# Patient Record
Sex: FEMALE | Race: WHITE | NOT HISPANIC OR LATINO | Employment: FULL TIME | URBAN - METROPOLITAN AREA
[De-identification: names, ages, dates, MRNs, and addresses within clinical notes are randomized per-mention and may not be internally consistent; named-entity substitution may affect disease eponyms.]

---

## 2023-07-18 PROBLEM — E10.9 TYPE 1 DIABETES MELLITUS WITHOUT COMPLICATION: Status: ACTIVE | Noted: 2023-07-18

## 2023-07-28 ENCOUNTER — OFFICE VISIT (OUTPATIENT)
Dept: ENDOCRINOLOGY | Facility: CLINIC | Age: 23
End: 2023-07-28
Payer: COMMERCIAL

## 2023-07-28 ENCOUNTER — SPECIALTY PHARMACY (OUTPATIENT)
Dept: ENDOCRINOLOGY | Facility: CLINIC | Age: 23
End: 2023-07-28
Payer: COMMERCIAL

## 2023-07-28 VITALS
BODY MASS INDEX: 17.66 KG/M2 | WEIGHT: 106 LBS | OXYGEN SATURATION: 98 % | HEIGHT: 65 IN | SYSTOLIC BLOOD PRESSURE: 110 MMHG | HEART RATE: 108 BPM | DIASTOLIC BLOOD PRESSURE: 76 MMHG

## 2023-07-28 DIAGNOSIS — E11.649 DIABETIC HYPOGLYCEMIA: ICD-10-CM

## 2023-07-28 DIAGNOSIS — E10.9 TYPE 1 DIABETES MELLITUS WITHOUT COMPLICATION: Primary | ICD-10-CM

## 2023-07-28 RX ORDER — GLUCAGON INJECTION, SOLUTION 1 MG/.2ML
1 INJECTION, SOLUTION SUBCUTANEOUS AS NEEDED
Qty: 0.4 ML | Refills: 4 | Status: SHIPPED | OUTPATIENT
Start: 2023-07-28

## 2023-07-28 RX ORDER — INSULIN LISPRO 100 [IU]/ML
INJECTION, SOLUTION INTRAVENOUS; SUBCUTANEOUS
COMMUNITY
Start: 2023-07-19

## 2023-07-28 RX ORDER — PROCHLORPERAZINE 25 MG/1
1 SUPPOSITORY RECTAL
Qty: 1 EACH | Refills: 3 | Status: SHIPPED | OUTPATIENT
Start: 2023-07-28

## 2023-07-28 NOTE — PROGRESS NOTES
-----------------------------------------------------------------  ENDOCRINE CLINIC NOTE  -----------------------------------------------------------------        PATIENT NAME: Luis Antonio Sykes  PATIENT : 2000 AGE: 23 y.o.  MRN NUMBER: [id number]  PRIMARY CARE: Amena Pickett APRN    ==========================================================================    CHIEF COMPLAINT: Type 1 Diabetes  DATE OF SERVICE: 2023         ==========================================================================    HPI / SUBJECTIVE    23 y.o. female seen in the clinic today for follow-up visit for type 1 diabetes.  Actively denied fever, chills, headache, chest pain, SOB, cough, nausea, vomiting, abdominal pain, diarrhea and focal weakness / numbness.  Patient was diagnosed with type 1 diabetes around age 15.  Previously has seen Dr. Huynh in the office.  Her current therapy includes:  Basaglar 20 units in the morning and 10 in the evening around 9:00 AM and p.m.  Lispro: 1:50 > 120 : 1-->7 units, typically takes around 2 units with meal.  Recently have used Medtronic 670 G and discontinued 1 year back, she is due for new pump around summer 2024.  Patient have Dexcom G6 reviewed  CGM Data:  Dates: July 15, 2023 till 2023  Very High > 250: 33%  High 180 - 250: 18%  Target: 70 - 180: 32%  Low 55 - 70:  7%  Very Low < 55: 10%    Reports DKA episode last 2022.    ==========================================================================    REVIEW OF SYSTEMS    Constitutional:  Denies fever or chills or tiredness  Eyes: Denies change in vision  HEENT:  Denies headache, sore throat or nasal congestion  Cardiovascular:  Denies chest pain, palpitation or shortness of breath  Respiratory:  Denies cough, shortness of breath or sputum  Gastrointestinal:  Denies abdominal pain, nausea, vomiting, diarrhea or constipation  Genitourinary:  Denies dysuria or polyuria or hematuria  Musculoskeletal:  Denies any  active muscle pain or bone pain  Neurologic:  Denies any focal weakness or numbness  Endocrine:  Please see HPI  Skin:  Denies any rash or skin breakdown    ==========================================================================                                                PAST MEDICAL HISTORY    Past Medical History:   Diagnosis Date    Diabetes mellitus type I        ==========================================================================    PAST SURGICAL HISTORY    Past Surgical History:   Procedure Laterality Date    KNEE SURGERY         ==========================================================================    FAMILY HISTORY    Family History   Problem Relation Age of Onset    Diabetes type II Mother     Diabetes type II Paternal Aunt     Diabetes type II Maternal Grandmother        ==========================================================================    SOCIAL HISTORY    Social History     Socioeconomic History    Marital status: Single    Number of children: 0    Years of education: 12    Highest education level: Some college, no degree   Tobacco Use    Smoking status: Never   Vaping Use    Vaping Use: Former    Devices: Disposable   Substance and Sexual Activity    Alcohol use: Yes     Comment: rarely    Drug use: Never    Sexual activity: Defer       ==========================================================================    MEDICATIONS      Current Outpatient Medications:     citalopram (CeleXA) 20 MG tablet, , Disp: , Rfl:     Continuous Blood Gluc Sensor (Dexcom G6 Sensor), , Disp: , Rfl:     Continuous Blood Gluc Transmit (Dexcom G6 Transmitter) misc, , Disp: , Rfl:     Ferrous Sulfate 27 MG tablet, , Disp: , Rfl:     hydrOXYzine (ATARAX) 10 MG tablet, , Disp: , Rfl:     hydrOXYzine (ATARAX) 25 MG tablet, Take 1 tablet by mouth., Disp: , Rfl:     Insulin Glargine (BASAGLAR KWIKPEN) 100 UNIT/ML injection pen, , Disp: , Rfl:     Insulin Lispro, 1 Unit Dial, (HUMALOG) 100 UNIT/ML solution  pen-injector, , Disp: , Rfl:     Iron-Vitamin C 100-250 MG tablet, Take  by mouth., Disp: , Rfl:     Insulin Lispro, 0.5 Unit Dial, (HUMALOG) 100 UNIT/ML solution pen-injector, , Disp: , Rfl:     ondansetron (ZOFRAN) 4 MG tablet, Take 1 tablet by mouth. (Patient not taking: Reported on 7/28/2023), Disp: , Rfl:     traMADol (ULTRAM) 50 MG tablet, Take  by mouth. (Patient not taking: Reported on 7/28/2023), Disp: , Rfl:     ==========================================================================    ALLERGIES    Allergies   Allergen Reactions    Lantus [Insulin Glargine] Other (See Comments)     Blood sugar spikes       ==========================================================================    OBJECTIVE    Vitals:    07/28/23 1515   BP: 110/76   Pulse: 108   SpO2: 98%     Body mass index is 17.64 kg/m².     General: Alert, cooperative, no acute distress  Lungs: Clear to auscultation bilaterally, respirations unlabored  Heart: Regular rate and rhythm, S1 and S2 normal, no murmur, rub or gallop  Abdomen: Soft, NT, ND and Bowel sounds Positive  Extremities:  Extremities normal, atraumatic, no cyanosis or edema    ==========================================================================    LAB EVALUATION    Lab Results   Component Value Date    GLUCOSE 133 (H) 07/18/2023    BUN 11 07/18/2023    CREATININE 0.67 07/18/2023    BCR 16.4 07/18/2023    K 4.1 07/18/2023    CO2 25.0 07/18/2023    CALCIUM 9.4 07/18/2023    ALBUMIN 4.8 07/18/2023    AST 15 07/18/2023    ALT 12 07/18/2023     Lab Results   Component Value Date    HGBA1C 7.10 (H) 07/18/2023     Lab Results   Component Value Date    MICROALBUR 4.1 07/18/2023    CREATININE 0.67 07/18/2023     Lab Results   Component Value Date    TSH 1.330 07/18/2023    FREET4 1.14 07/18/2023     tatus: Final result       Visible to patient: Yes (seen)       Next appt: 09/18/2023 at 03:00 PM in Endocrinology ([address])       Dx: Type 1 diabetes mellitus without  comp...    Specimen Information: Blood   0 Result Notes      Component  Ref Range & Units 10 d ago   NILSA-65  0.0 - 5.0 U/mL Comment Abnormal    Comment: NILSA-65 result greater than 25,000 U/mL  Results confirmed on dilution.  Results confirmed on  dilution.        Component      Latest Ref Rng 7/18/2023   C-Peptide      1.1 - 4.4 ng/mL <0.1 (L)       (L) Low    ==========================================================================    ASSESSMENT AND PLAN    #Type 1 diabetes with history of DKA  #Hypoglycemia with unawareness  - Reviewed continuous glucose monitoring data  - Reviewed blood work including A1c, CBC, CMP, urine microalbumin, antibody profile and C-peptide levels  - There is definitive diagnosis of type 1 diabetes as patient has absolute no C-peptide levels and significantly elevated nilsa 65 which is confirmatory for type 1 diabetes  - Review of continuous glucose monitoring there is evidence significant for hypoglycemia that is happening around 1 to 2 AM and lasting up till 8 AM along with evidence of postprandial hyperglycemia  - Patient is currently being treated with high basal insulin and inappropriately low mealtime coverage therefore basal insulin is trying to behave as mealtime insulin  - For now we will redistribute insulin therapy  - Her new regimen is going to be:  *Basaglar 20 units once a day  *Insulin lispro 5 units with each meal  *Correction scale #1  - Patient to continue CGM monitoring and follow-up in my clinic in 2 months time  - TSH and free T4 were evaluated, she continues to remain high risk for autoimmune thyroiditis in future but currently within acceptable limit  - We will continue thyroid profile monitoring once a year  - Patient is scheduled to follow-up with diabetic educator in September 2023  - In future if patient is more comfortable can switch insulin lispro coverage to carb counting    Thank you for courtesy of consultation.    Return to clinic: 2 months    Entire  "assessment and plan was discussed and counseled the patient in detail to which patient verbalized understanding and agreed with care.  Answered all queries and concerns.    This note was created using voice recognition software and is inherently subject to errors including those of syntax and \"sound-alike\" substitutions which may escape proofreading.  In such instances, original meaning may be extrapolated by contextual derivation.    ==========================================================================    INFORMATION PROVIDED TO PATIENT    Patient Instructions   Please,    # Diabetes Management:    Please start insulin therapy as:    - Insulin Glargine (Slow acting insulin) 20 Units in the morning.  - Insulin lispro / aspart (Fast acting / meal time insulin): 5 Units with each meal.    Meal time insulin need to be taken 15 mins before meal. You can bring your insulin with you if you are planning to eat out.    If you plan to miss the meal please miss the meal time insulin as well.    Check your blood glucose four times a day: before breakfast, before lunch, before dinner and before bedtime. Maintain blood glucose logs.    - If your blood glucose in the morning / fasting is less than 100 please decrease the dose of Insulin Glargine by 5 units.    - If your blood glucose before meal is less than 100 then decrease the dose of Insulin Lispro / Aspart by 2 units    Use correction insulin if your blood is high along with your meal time insulin. Use this correction insulin before meal only, not to be taken without meal.    Corrections for High Blood Sugar  Use the following guide to “correct” for pre-meal high blood sugar.  This insulin will help “correct” the high reading.  You will still need to take as per scheduled meal insulin.    If your   Blood Sugar Reading is…. Number of additional   units of Insulin Lispro to Take…  (Correction)    Take 0 additional unit   151-200 Take 1 additional unit   201-250 Take " 2 additional unit   251-300 Take 3 additional unit   301-350 Take 4 additional unit   351-400 Take 5 additional unit   More than 400 Take 6 additional unit         Low Blood Glucose:    - If you feel sweaty, anxious, shakiness, feel weak, trouble walking, feels like passing out or trouble seeing thing, please check your blood glucose and if its less than 70 or if your feel symptoms of low blood glucose then take one of the following or use Glucagon Injection:    *3 or 4 glucose tablets  *½ cup of juice or regular soda (not sugar-free)  *2 tablespoons of raisins  *4 or 5 saltine crackers  *1 tablespoon of sugar  *1 tablespoon of honey or corn syrup  *6 to 8 hard candies    Follow up in 2 months    Thank you for your visit today.    If you have any questions or concerns please feel free to reach out of the office.        ==========================================================================  Boyd Ronquillo MD  Department of Endocrine, Diabetes and Metabolism  [facility], [place]  ==========================================================================

## 2023-07-28 NOTE — PROGRESS NOTES
Specialty Pharmacy Patient Management Program  Endocrinology Introduction to Services Outreach     Luis Antonio Sykes is a 23 y.o. female seen by an Endocrinology provider for Type 1 Diabetes . This was an initial visit to introduce Endocrinology Patient Management Program and Specialty Pharmacy services offered by [facility], as the patient is taking specialty medication continuous glucose monitor, basaglar, and humalog.     Luis Antonio Sykes is undecided about filling specialty medication at [facility] and/or enrollment in the Endocrine Disorders Patient Management Program at this time and enrollment is therefore UNDER REVIEW. Patient remains eligible for services in the future if desired. Plan to follow-up at her next office visit.    Results of Benefits Investigation  Medicaid-$0     Relevant Past Medical History and Comorbidities  Past Medical History:   Diagnosis Date    Diabetes mellitus type I      Social History     Socioeconomic History    Marital status: Single    Number of children: 0    Years of education: 12    Highest education level: Some college, no degree   Tobacco Use    Smoking status: Never   Vaping Use    Vaping Use: Former    Devices: Disposable   Substance and Sexual Activity    Alcohol use: Yes     Comment: rarely    Drug use: Never    Sexual activity: Defer          Allergies  Lantus [insulin glargine]       Current Medication List    Current Outpatient Medications:     citalopram (CeleXA) 20 MG tablet, , Disp: , Rfl:     Continuous Blood Gluc Sensor (Dexcom G6 Sensor), , Disp: , Rfl:     Continuous Blood Gluc Transmit (Dexcom G6 Transmitter) misc, 1 each by Other route 4 (Four) Times a Day Before Meals & at Bedtime. Change every 3 months., Disp: 1 each, Rfl: 3    Ferrous Sulfate 27 MG tablet, , Disp: , Rfl:     Glucagon (Gvoke HypoPen 2-Pack) 1 MG/0.2ML solution auto-injector, Inject 1 each under the skin into the appropriate area as directed As Needed  (Hypoglycemia)., Disp: 0.4 mL, Rfl: 4    hydrOXYzine (ATARAX) 10 MG tablet, , Disp: , Rfl:     hydrOXYzine (ATARAX) 25 MG tablet, Take 1 tablet by mouth., Disp: , Rfl:     Insulin Glargine (BASAGLAR KWIKPEN) 100 UNIT/ML injection pen, , Disp: , Rfl:     Insulin Lispro, 1 Unit Dial, (HUMALOG) 100 UNIT/ML solution pen-injector, , Disp: , Rfl:     Iron-Vitamin C 100-250 MG tablet, Take  by mouth., Disp: , Rfl:        Changes to Therapy Plan Discussed with Patient  Medication Therapy Changes by Provider New start glucagon in hypoglycemic patient. Provided education on administration. Continue basaglar at 20 units in the morning and humalog 5 units with meals.   Discussed briefly the pharmacy services and provided patient with our contact info. Will follow up with patient at a future appointment.  Additional Plans, Therapy Recommendations, or Therapy Problems to Be Addressed: None     Eneida Cleary, PharmD  Clinical Specialty Pharmacist, Endocrinology  7/28/2023  16:08 EDT

## 2023-07-28 NOTE — PATIENT INSTRUCTIONS
Please,    # Diabetes Management:    Please start insulin therapy as:    - Insulin Glargine (Slow acting insulin) 20 Units in the morning.  - Insulin lispro / aspart (Fast acting / meal time insulin): 5 Units with each meal.    Meal time insulin need to be taken 15 mins before meal. You can bring your insulin with you if you are planning to eat out.    If you plan to miss the meal please miss the meal time insulin as well.    Check your blood glucose four times a day: before breakfast, before lunch, before dinner and before bedtime. Maintain blood glucose logs.    - If your blood glucose in the morning / fasting is less than 100 please decrease the dose of Insulin Glargine by 5 units.    - If your blood glucose before meal is less than 100 then decrease the dose of Insulin Lispro / Aspart by 2 units    Use correction insulin if your blood is high along with your meal time insulin. Use this correction insulin before meal only, not to be taken without meal.    Corrections for High Blood Sugar  Use the following guide to “correct” for pre-meal high blood sugar.  This insulin will help “correct” the high reading.  You will still need to take as per scheduled meal insulin.    If your   Blood Sugar Reading is…. Number of additional   units of Insulin Lispro to Take…  (Correction)    Take 0 additional unit   151-200 Take 1 additional unit   201-250 Take 2 additional unit   251-300 Take 3 additional unit   301-350 Take 4 additional unit   351-400 Take 5 additional unit   More than 400 Take 6 additional unit         Low Blood Glucose:    - If you feel sweaty, anxious, shakiness, feel weak, trouble walking, feels like passing out or trouble seeing thing, please check your blood glucose and if its less than 70 or if your feel symptoms of low blood glucose then take one of the following or use Glucagon Injection:    *3 or 4 glucose tablets  *½ cup of juice or regular soda (not sugar-free)  *2 tablespoons of raisins  *4 or  5 saltine crackers  *1 tablespoon of sugar  *1 tablespoon of honey or corn syrup  *6 to 8 hard candies    Follow up in 2 months    Thank you for your visit today.    If you have any questions or concerns please feel free to reach out of the office.

## 2023-09-18 ENCOUNTER — OFFICE VISIT (OUTPATIENT)
Dept: ENDOCRINOLOGY | Facility: CLINIC | Age: 23
End: 2023-09-18
Payer: COMMERCIAL

## 2023-09-18 ENCOUNTER — TELEPHONE (OUTPATIENT)
Dept: ENDOCRINOLOGY | Facility: CLINIC | Age: 23
End: 2023-09-18

## 2023-09-18 DIAGNOSIS — E10.9 TYPE 1 DIABETES MELLITUS WITHOUT COMPLICATION: Primary | ICD-10-CM

## 2023-09-18 PROCEDURE — G0108 DIAB MANAGE TRN  PER INDIV: HCPCS

## 2023-09-18 NOTE — PROGRESS NOTES
Pt here today for 50 min from 3:00 -3:50 PM for DSMES. T1DM dx at age 15 (8 yrs ago). Pt has been through DM classes, but here for diet focused refresher. Pt states that her BG has been running high for past 3 weeks, also reports undesired wt loss over the past few months. Currently 101 lbs, goal wt ~120lbs. Pt saw Dr Ronquillo on 7/28/23. Insulin was adjusted to Basaglar 20 units once a day and lispro 5 units with each meal. Pt did not understand that she was to take 5 units of insulin in addition to correction scale. Pt has been taking 1 unit if between 151-200 vs 6 units. Pt to start taking insulin as rx'd and call Friday if BG continues in 200s. Reviewed Dexcom clarity report. Pt avg 253, very high 52% of the time, 1% low, and <1% very low. Pt states that she has not had problems with low BG since seeing Dr Ronquillo. Per pt needs rx for Ketostix--reviewed use. Pt to test while running high. Reviewed DKA symptoms with pt. Pt currently eating 3 meals per day. Discussed ways to increase kcals, protein, and fiber. Counseled on one plate method/label reading/carb counting. Pt with good background knowledge. Pt to add 2 snacks per day--15g carb + protein. RD to send rx for ketostix.      Corrections for High Blood Sugar per MD note  Use the following guide to “correct” for pre-meal high blood sugar.  This insulin will help “correct” the high reading.  You will still need to take as per scheduled meal insulin.     If your   Blood Sugar Reading is…. Number of additional   units of Insulin Lispro to Take…  (Correction)    Take 0 additional unit   151-200 Take 1 additional unit   201-250 Take 2 additional unit   251-300 Take 3 additional unit   301-350 Take 4 additional unit   351-400 Take 5 additional unit   More than 400 Take 6 additional unit

## 2023-09-29 ENCOUNTER — OFFICE VISIT (OUTPATIENT)
Dept: ENDOCRINOLOGY | Facility: CLINIC | Age: 23
End: 2023-09-29
Payer: COMMERCIAL

## 2023-09-29 VITALS
SYSTOLIC BLOOD PRESSURE: 110 MMHG | BODY MASS INDEX: 17.49 KG/M2 | HEART RATE: 118 BPM | WEIGHT: 105 LBS | DIASTOLIC BLOOD PRESSURE: 78 MMHG | HEIGHT: 65 IN | OXYGEN SATURATION: 98 %

## 2023-09-29 DIAGNOSIS — E10.9 TYPE 1 DIABETES MELLITUS WITHOUT COMPLICATION: Primary | ICD-10-CM

## 2023-09-29 DIAGNOSIS — E11.649 DIABETIC HYPOGLYCEMIA: ICD-10-CM

## 2023-09-29 RX ORDER — PEN NEEDLE, DIABETIC 30 GX3/16"
1 NEEDLE, DISPOSABLE MISCELLANEOUS
Qty: 200 EACH | Refills: 5 | Status: SHIPPED | OUTPATIENT
Start: 2023-09-29

## 2023-09-29 RX ORDER — INSULIN LISPRO 100 [IU]/ML
8 INJECTION, SOLUTION INTRAVENOUS; SUBCUTANEOUS 3 TIMES DAILY
Qty: 15 ML | Refills: 5 | Status: SHIPPED | OUTPATIENT
Start: 2023-09-29

## 2023-09-29 RX ORDER — TRAZODONE HYDROCHLORIDE 50 MG/1
50 TABLET ORAL NIGHTLY
COMMUNITY
Start: 2023-08-17

## 2023-09-29 RX ORDER — INSULIN GLARGINE 100 [IU]/ML
18 INJECTION, SOLUTION SUBCUTANEOUS DAILY
Qty: 15 ML | Refills: 5 | Status: SHIPPED | OUTPATIENT
Start: 2023-09-29

## 2023-09-29 RX ORDER — PROCHLORPERAZINE 25 MG/1
SUPPOSITORY RECTAL
Qty: 9 EACH | Refills: 3 | Status: SHIPPED | OUTPATIENT
Start: 2023-09-29

## 2023-09-29 NOTE — PATIENT INSTRUCTIONS
Please,    # Diabetes Management:    Please start insulin therapy as:    - Insulin Glargine (Slow acting insulin) 18 Units in the morning.  - Insulin lispro / aspart (Fast acting / meal time insulin): Use insulin to carb ratio.  1 unit for every 20 g of carbohydrates.  If after 1 week time you still see significant high sugar after meal you can increase your carb ratios to 1 unit for every 15 g of carbohydrate.    Meal time insulin need to be taken 15 mins before meal. You can bring your insulin with you if you are planning to eat out.    If you plan to miss the meal please miss the meal time insulin as well.    Check your blood glucose four times a day: before breakfast, before lunch, before dinner and before bedtime. Maintain blood glucose logs.    - If your blood glucose in the morning / fasting is less than 100 please decrease the dose of Insulin Glargine by 3 units.    Use correction insulin if your blood is high along with your meal time insulin. Use this correction insulin before meal only, not to be taken without meal.    Corrections for High Blood Sugar  Use the following guide to “correct” for pre-meal high blood sugar.  This insulin will help “correct” the high reading.  You will still need to take as per scheduled meal insulin.    If your   Blood Sugar Reading is…. Number of additional   units of Insulin Lispro to Take…  (Correction)    Take 0 additional unit   151-200 Take 1 additional unit   201-250 Take 2 additional unit   251-300 Take 3 additional unit   301-350 Take 4 additional unit   351-400 Take 5 additional unit   More than 400 Take 6 additional unit         Low Blood Glucose:    - If you feel sweaty, anxious, shakiness, feel weak, trouble walking, feels like passing out or trouble seeing thing, please check your blood glucose and if its less than 70 or if your feel symptoms of low blood glucose then take one of the following or use Glucagon Injection:    *3 or 4 glucose tablets  *½ cup of  juice or regular soda (not sugar-free)  *2 tablespoons of raisins  *4 or 5 saltine crackers  *1 tablespoon of sugar  *1 tablespoon of honey or corn syrup  *6 to 8 hard candies    Follow up in 3 months with repeat blood work.    Thank you for your visit today.    If you have any questions or concerns please feel free to reach out of the office.

## 2023-09-29 NOTE — PROGRESS NOTES
-----------------------------------------------------------------  ENDOCRINE CLINIC NOTE  -----------------------------------------------------------------        PATIENT NAME: Luis Antonio Sykes  PATIENT : 2000 AGE: 23 y.o.  MRN NUMBER: [id number]  PRIMARY CARE: Amena Pickett APRN    ==========================================================================    CHIEF COMPLAINT: Type 1 Diabetes  DATE OF SERVICE: 23     ==========================================================================    HPI / SUBJECTIVE    23 y.o. female seen in the clinic today for follow-up visit for type 1 diabetes.  Last A1c was 7.1% on 2023.  Diagnosed with type 1 diabetes around age 15.  Previous used to follow Dr. Huynh.  Current therapy includes:  Basaglar 20 units morning  Insulin lispro 5 units with each meal plus sliding scale #1  Recently had diabetic education evaluation.  During evaluation there was a concern that there was a miscommunication and patient was doing 20 units of Basaglar in the morning along with insulin lispro only has correction starting at 5 units.  Discussed by dietitian that patient needs to take 5 units and correction scale along with it.  Patient verbalized understanding after that visit and started therapy accordingly.  Checking blood sugar through Dexcom G6.  Patient was out of Dexcom for last 5 days duration and checking through [employer].  Patient had previously used Medtronic 670 G and discontinue around 1 week back patient is due for new pump around summer 2024.  Reports DKA episode last 2022.    ==========================================================================    REVIEW OF SYSTEMS    Constitutional:  Denies fever or chills or tiredness  Eyes: Denies change in vision  HEENT:  Denies headache, sore throat or nasal congestion  Cardiovascular:  Denies chest pain, palpitation or shortness of breath  Respiratory:  Denies cough, shortness of breath or  sputum  Gastrointestinal:  Denies abdominal pain, nausea, vomiting, diarrhea or constipation  Genitourinary:  Denies dysuria or polyuria or hematuria  Musculoskeletal:  Denies any active muscle pain or bone pain  Neurologic:  Denies any focal weakness or numbness  Endocrine:  Please see HPI  Skin:  Denies any rash or skin breakdown    ==========================================================================                                                PAST MEDICAL HISTORY    Past Medical History:   Diagnosis Date    Diabetes mellitus type I        ==========================================================================    PAST SURGICAL HISTORY    Past Surgical History:   Procedure Laterality Date    KNEE SURGERY         ==========================================================================    FAMILY HISTORY    Family History   Problem Relation Age of Onset    Diabetes type II Mother     Diabetes Mother         Diseases    Diabetes type II Paternal Aunt     Diabetes type II Maternal Grandmother     Diabetes Maternal Grandmother         Diseased    Diabetes Maternal Aunt        ==========================================================================    SOCIAL HISTORY    Social History     Socioeconomic History    Marital status: Single    Number of children: 0    Years of education: 12    Highest education level: Some college, no degree   Tobacco Use    Smoking status: Never   Vaping Use    Vaping Use: Former    Devices: Disposable   Substance and Sexual Activity    Alcohol use: Yes     Comment: rarely    Drug use: Never    Sexual activity: Yes     Partners: Male     Birth control/protection: Nexplanon       ==========================================================================    MEDICATIONS      Current Outpatient Medications:     acetone, urine, test strip, Use 1 strip As Needed for High Blood Sugar., Disp: 50 each, Rfl: 3    citalopram (CeleXA) 20 MG tablet, , Disp: , Rfl:     Continuous Blood Gluc  Transmit (Dexcom G6 Transmitter) misc, 1 each by Other route 4 (Four) Times a Day Before Meals & at Bedtime. Change every 3 months., Disp: 1 each, Rfl: 3    Ferrous Sulfate 27 MG tablet, , Disp: , Rfl:     Glucagon (Gvoke HypoPen 2-Pack) 1 MG/0.2ML solution auto-injector, Inject 1 each under the skin into the appropriate area as directed As Needed (Hypoglycemia)., Disp: 0.4 mL, Rfl: 4    hydrOXYzine (ATARAX) 10 MG tablet, , Disp: , Rfl:     hydrOXYzine (ATARAX) 25 MG tablet, Take 1 tablet by mouth., Disp: , Rfl:     Iron-Vitamin C 100-250 MG tablet, Take  by mouth., Disp: , Rfl:     traZODone (DESYREL) 50 MG tablet, Take 1 tablet by mouth Every Night., Disp: , Rfl:     ==========================================================================    ALLERGIES    Allergies   Allergen Reactions    Lantus [Insulin Glargine] Other (See Comments)     Blood sugar spikes       ==========================================================================    OBJECTIVE    Vitals:    09/29/23 1436   BP: 110/78   Pulse: 118   SpO2: 98%     Body mass index is 17.47 kg/m².     General: Alert, cooperative, no acute distress  Lungs: Clear to auscultation bilaterally, respirations unlabored  Heart: Regular rate and rhythm, S1 and S2 normal, no murmur, rub or gallop  Abdomen: Soft, NT, ND and Bowel sounds Positive  Extremities:  Extremities normal, atraumatic, no cyanosis or edema    ==========================================================================    LAB EVALUATION    Lab Results   Component Value Date    GLUCOSE 133 (H) 07/18/2023    BUN 11 07/18/2023    CREATININE 0.67 07/18/2023    BCR 16.4 07/18/2023    K 4.1 07/18/2023    CO2 25.0 07/18/2023    CALCIUM 9.4 07/18/2023    ALBUMIN 4.8 07/18/2023    AST 15 07/18/2023    ALT 12 07/18/2023     Lab Results   Component Value Date    HGBA1C 7.10 (H) 07/18/2023     Lab Results   Component Value Date    MICROALBUR 4.1 07/18/2023    CREATININE 0.67 07/18/2023     Lab Results   Component  Value Date    TSH 1.330 07/18/2023    FREET4 1.14 07/18/2023     CGM Data:    Dates: September 9, 2023 till September 22, 2023    Very High > 250: 44%  High 180 - 250: 19%  Target: 70 - 180: 34%  Low 55 - 70:  2%  Very Low < 55: Less than 1%    Patient had standard deviation of 106 with average blood glucose of 232 with GMI of 8.9%.  This was the reading on blood glucose when patient was doing correction scale only when not using scheduled mealtime.  Patient for last days is using most scheduled mealtime and there is evidence of some hypoglycemia or low normal blood glucose post dinnertime.  Also there is a pattern of nocturnal hypoglycemia.    ==========================================================================    ASSESSMENT AND PLAN    #Type 1 diabetes with history of DKA  #Hypoglycemia with unawareness    -Reviewed CGM data and there is evidence of nocturnal hypoglycemia will de-escalate the dose of basal insulin to 18 units, target is to maintain nighttime blood sugar between 90 and 140  - Also patient is using mealtime insulin now after the dietitian visit and blood sugar are better but there is episodes of hypoglycemia associated as well  - Patient seems to be confident about insulin to carb ratio, in the past patient have used insulin carb ratio 1 is to 25  - This may be little low for this patient, discussed with patient either using insulin to carb ratio of 1:20 or 1: 15, plan is to start with 1: 20 to avoid any hypoglycemia for now  - If needed can escalate therapy to 1: 15 ICU  - ISF will continue to remain 1: 50 > 150  - Patient to follow-up in my clinic back again in 3 months time  - Repeat blood work before next visit    Thank you for courtesy of consultation.    Return to clinic: 3 months    Entire assessment and plan was discussed and counseled the patient in detail to which patient verbalized understanding and agreed with care.  Answered all queries and concerns.    This note was created using  "voice recognition software and is inherently subject to errors including those of syntax and \"sound-alike\" substitutions which may escape proofreading.  In such instances, original meaning may be extrapolated by contextual derivation.    ==========================================================================    INFORMATION PROVIDED TO PATIENT    Patient Instructions   Please,    # Diabetes Management:    Please start insulin therapy as:    - Insulin Glargine (Slow acting insulin) 18 Units in the morning.  - Insulin lispro / aspart (Fast acting / meal time insulin): Use insulin to carb ratio.  1 unit for every 20 g of carbohydrates.  If after 1 week time you still see significant high sugar after meal you can increase your carb ratios to 1 unit for every 15 g of carbohydrate.    Meal time insulin need to be taken 15 mins before meal. You can bring your insulin with you if you are planning to eat out.    If you plan to miss the meal please miss the meal time insulin as well.    Check your blood glucose four times a day: before breakfast, before lunch, before dinner and before bedtime. Maintain blood glucose logs.    - If your blood glucose in the morning / fasting is less than 100 please decrease the dose of Insulin Glargine by 3 units.    Use correction insulin if your blood is high along with your meal time insulin. Use this correction insulin before meal only, not to be taken without meal.    Corrections for High Blood Sugar  Use the following guide to “correct” for pre-meal high blood sugar.  This insulin will help “correct” the high reading.  You will still need to take as per scheduled meal insulin.    If your   Blood Sugar Reading is…. Number of additional   units of Insulin Lispro to Take…  (Correction)    Take 0 additional unit   151-200 Take 1 additional unit   201-250 Take 2 additional unit   251-300 Take 3 additional unit   301-350 Take 4 additional unit   351-400 Take 5 additional unit   More " than 400 Take 6 additional unit         Low Blood Glucose:    - If you feel sweaty, anxious, shakiness, feel weak, trouble walking, feels like passing out or trouble seeing thing, please check your blood glucose and if its less than 70 or if your feel symptoms of low blood glucose then take one of the following or use Glucagon Injection:    *3 or 4 glucose tablets  *½ cup of juice or regular soda (not sugar-free)  *2 tablespoons of raisins  *4 or 5 saltine crackers  *1 tablespoon of sugar  *1 tablespoon of honey or corn syrup  *6 to 8 hard candies    Follow up in 3 months with repeat blood work.    Thank you for your visit today.    If you have any questions or concerns please feel free to reach out of the office.        ==========================================================================  Boyd Ronquillo MD  Department of Endocrine, Diabetes and Metabolism  [facility] [place]  ==========================================================================

## 2023-10-05 ENCOUNTER — TELEPHONE (OUTPATIENT)
Dept: ENDOCRINOLOGY | Facility: CLINIC | Age: 23
End: 2023-10-05
Payer: COMMERCIAL

## 2023-10-05 NOTE — TELEPHONE ENCOUNTER
Called and spoke to pt regarding her shirlene about insulin pump warranty. Informed her that she could call the insurance company and see when she would be eligible for a new pump as warranty status doesn't determine if her pump would be upgraded. Pt acknowledges understanding.       Mariela Estrada RD, LD, [facility]   Nutrition and Diabetes Education     Diabetes Center   [facility] Endocrinology/Dia  [address] [facility] [place] [address]

## 2023-11-06 ENCOUNTER — TELEPHONE (OUTPATIENT)
Dept: ENDOCRINOLOGY | Facility: CLINIC | Age: 23
End: 2023-11-06
Payer: COMMERCIAL

## 2023-11-06 NOTE — TELEPHONE ENCOUNTER
Called pharmacy regarding Dexcom. Sensors are ready for . It was too early to fill transmitter at last request. Now able to refill transmitter. Had pharmacy start the order for transmitter.       Called and let pt know. Pt acknowledged understanding.       Mariela Estrada RD, LD, [facility]   Nutrition and Diabetes Education     Diabetes Center   [facility] Endocrinology/Dia  [address] [place] [address]

## 2023-11-06 NOTE — TELEPHONE ENCOUNTER
Received call from pt regarding her Dexcom. Pt states that the pharmacy wont fill it.    I attempted to call pharmacy but they were at lunch. Will call back later this afternoon to speak with them.       Mariela Estrada RD, LD, [facility]   Nutrition and Diabetes Education     Diabetes Center   [facility] Endocrinology/Dia  [address] [place] [address]

## 2023-11-30 ENCOUNTER — TELEPHONE (OUTPATIENT)
Dept: ENDOCRINOLOGY | Facility: CLINIC | Age: 23
End: 2023-11-30
Payer: COMMERCIAL

## 2023-12-11 ENCOUNTER — TELEPHONE (OUTPATIENT)
Dept: ENDOCRINOLOGY | Facility: CLINIC | Age: 23
End: 2023-12-11
Payer: COMMERCIAL

## 2023-12-11 NOTE — TELEPHONE ENCOUNTER
Spoke with patient and PA required for [facility], pt is allergic to Lantus. Started PA (Key: [id number])    Approved today  PA Case: [id number], Status: Approved, Coverage Starts on: 12/11/2023 12:00:00 AM, Coverage Ends on: 12/10/2024 12:00:00 AM.     Notified patient

## 2023-12-13 ENCOUNTER — TELEPHONE (OUTPATIENT)
Dept: ENDOCRINOLOGY | Facility: CLINIC | Age: 23
End: 2023-12-13
Payer: COMMERCIAL

## 2023-12-13 NOTE — TELEPHONE ENCOUNTER
Caller: [facility]AN[facility]    Relationship to patient:     Best call back number: [phone]     Patient is needing: [facility] HAS FAXED US A REQUEST 3 TIMES.  11/29, 12/6 AND AGAIN TODAY 12/13 THEY ARE SENDING AGAIN TO THE VERIFIED FAX NUMBER. PT NEEDS US TO CONTACT [facility] WITH PROPER PAPERWORK/ OFFICE NOTES SO PT CAN GET THE CARE THEY NEED FROM [facility] AND [facility]. PLEASE ADVISE ASAP.

## 2023-12-14 NOTE — TELEPHONE ENCOUNTER
ON from 9/29/23 sent to [facility] medical. PO and Face to Face encounter summary on MD desk for signature.  Will need to fax to [facility] medical once signed.

## 2023-12-15 ENCOUNTER — TELEPHONE (OUTPATIENT)
Dept: ENDOCRINOLOGY | Facility: CLINIC | Age: 23
End: 2023-12-15

## 2023-12-15 NOTE — TELEPHONE ENCOUNTER
Patient calling again needing an update on [facility]. Told patient ON were faxed over and paperwork on providers desk per Jigna's note. Paperwork on providers desk was signed but wrong provider listed so I did not fax paperwork to [facility]. Please advise.

## 2023-12-15 NOTE — TELEPHONE ENCOUNTER
ON from July and Sept sent to [facility].  They are faxing over a new physicians order with the correct MD on it.  The one we had given Dr. Ronquillo had a different doctors name on it. Need to fax to [facility] as soon as it is signed.

## 2023-12-15 NOTE — TELEPHONE ENCOUNTER
ON from July and Sept sent to [facility].  They are faxing over a new physicians order with the correct MD on it.  The one we had given Dr. Ronquillo had a different doctors name on it. Need to fax to [facility] as soon as it is signed.

## 2023-12-15 NOTE — TELEPHONE ENCOUNTER
Caller: Luis Antonio Sykes    Relationship to patient: Self    Best call back number: [phone]    Patient is needing: PT CALLING TO SPEAK WITH DR. MAZA OFFICE WOULD NOT TELL ME WHAT THE CALL WAS IN REGARDS TO WHILE ON HOLD PT HUNG UP. PLEASE CALL PT BACK

## 2023-12-26 ENCOUNTER — TELEPHONE (OUTPATIENT)
Dept: ENDOCRINOLOGY | Facility: CLINIC | Age: 23
End: 2023-12-26
Payer: COMMERCIAL

## 2023-12-26 NOTE — TELEPHONE ENCOUNTER
Hub staff attempted to follow warm transfer process and was unsuccessful     Caller: Luis Antonio Sykes    Relationship to patient: Self    Best call back number: [phone]    Patient is needing: PATIENT CALLED BECAUSE SHE WAS TOLD BY [facility] AND HER INSURANCE THAT SHE WOULD NEED A PRIOR AUTHORIZATION FOR HER TANDEM PUMP TO BE APPROVED. PLEASE SEND ONE TO EACH OF [facility], [employer], AS WELL AS THE MEDICARE INSURANCE. PATIENT HAS BEEN UNABLE TO GET HER NEW PUMP IN THE LAST 3-4 MONTHS AND IS VERY MUCH IN NEED OF IT.

## 2023-12-27 ENCOUNTER — LAB (OUTPATIENT)
Dept: LAB | Facility: HOSPITAL | Age: 23
End: 2023-12-27
Payer: COMMERCIAL

## 2023-12-27 ENCOUNTER — OFFICE VISIT (OUTPATIENT)
Dept: ENDOCRINOLOGY | Facility: CLINIC | Age: 23
End: 2023-12-27
Payer: COMMERCIAL

## 2023-12-27 VITALS
HEART RATE: 100 BPM | BODY MASS INDEX: 17.73 KG/M2 | OXYGEN SATURATION: 96 % | DIASTOLIC BLOOD PRESSURE: 80 MMHG | SYSTOLIC BLOOD PRESSURE: 98 MMHG | HEIGHT: 65 IN | WEIGHT: 106.4 LBS

## 2023-12-27 DIAGNOSIS — E10.9 TYPE 1 DIABETES MELLITUS WITHOUT COMPLICATION: ICD-10-CM

## 2023-12-27 DIAGNOSIS — E10.9 TYPE 1 DIABETES MELLITUS WITHOUT COMPLICATION: Primary | ICD-10-CM

## 2023-12-27 DIAGNOSIS — E11.649 DIABETIC HYPOGLYCEMIA: ICD-10-CM

## 2023-12-27 PROCEDURE — 80053 COMPREHEN METABOLIC PANEL: CPT

## 2023-12-27 PROCEDURE — 82043 UR ALBUMIN QUANTITATIVE: CPT

## 2023-12-27 PROCEDURE — 82570 ASSAY OF URINE CREATININE: CPT

## 2023-12-27 PROCEDURE — 36415 COLL VENOUS BLD VENIPUNCTURE: CPT

## 2023-12-27 PROCEDURE — 83036 HEMOGLOBIN GLYCOSYLATED A1C: CPT

## 2023-12-27 NOTE — PATIENT INSTRUCTIONS
Please,    # Diabetes Management:    Please continue insulin therapy as:    - Insulin Glargine (Slow acting insulin) 18 Units in the morning.  - Insulin lispro / aspart (Fast acting / meal time insulin): Use insulin to carb ratio.  1 unit for every 20 g of carbohydrates.    Meal time insulin need to be taken 15 mins before meal. You can bring your insulin with you if you are planning to eat out.    If you plan to miss the meal please miss the meal time insulin as well.    Check your blood glucose Dexcom.  If there is any problem with your Dexcom check your blood sugar 4 times a day, fasting, before lunch, before dinner and before bedtime.  Please maintain the log of your blood sugars that you check through fingersticks.    - If your blood glucose in the morning / fasting is less than 100 please decrease the dose of Insulin Glargine by 3 units.    Use correction insulin if your blood is high along with your meal time insulin. Use this correction insulin before meal only, not to be taken without meal.    Corrections for High Blood Sugar  Use the following guide to “correct” for pre-meal high blood sugar.  This insulin will help “correct” the high reading.  You will still need to take as per scheduled meal insulin.    If your   Blood Sugar Reading is…. Number of additional   units of Insulin Lispro to Take…  (Correction)    Take 0 additional unit   151-200 Take 1 additional unit   201-250 Take 2 additional unit   251-300 Take 3 additional unit   301-350 Take 4 additional unit   351-400 Take 5 additional unit   More than 400 Take 6 additional unit         Low Blood Glucose:    - If you feel sweaty, anxious, shakiness, feel weak, trouble walking, feels like passing out or trouble seeing thing, please check your blood glucose and if its less than 70 or if your feel symptoms of low blood glucose then take one of the following or use Glucagon Injection:    *3 or 4 glucose tablets  *½ cup of juice or regular soda (not  sugar-free)  *2 tablespoons of raisins  *4 or 5 saltine crackers  *1 tablespoon of sugar  *1 tablespoon of honey or corn syrup  *6 to 8 hard candies    Follow up in 3 months with repeat blood work.    Thank you for your visit today.    If you have any questions or concerns please feel free to reach out of the office.

## 2023-12-27 NOTE — PROGRESS NOTES
-----------------------------------------------------------------  ENDOCRINE CLINIC NOTE  -----------------------------------------------------------------        PATIENT NAME: Luis Antonio Syeks  PATIENT : 2000 AGE: 23 y.o.  MRN NUMBER: [id number]  PRIMARY CARE: Amena Pickett APRN    ==========================================================================    CHIEF COMPLAINT: Type 1 Diabetes  DATE OF SERVICE: 23     ==========================================================================    HPI / SUBJECTIVE    23 y.o. female seen in the clinic today for follow-up visit for type 1 diabetes.  Last A1c was 7.1% on 2023.  Diagnosed with type 1 diabetes around age 15.  Current therapy includes:  Basaglar 18 units morning  Insulin lispro ICO 1:20   ISF 1:50 > 150  And is currently checking blood sugar through Dexcom G6.  Unable to get Dexcom data as patient recently changed the phone and Dexcom was not compatible.  Patient restarted Dexcom with reader.  Patient is out of strips.  Reports DKA episode last 2022.  No pump yet, still pending tandem pump placement.    ==========================================================================                                                PAST MEDICAL HISTORY    Past Medical History:   Diagnosis Date    Diabetes mellitus type I        ==========================================================================    PAST SURGICAL HISTORY    Past Surgical History:   Procedure Laterality Date    KNEE SURGERY         ==========================================================================    FAMILY HISTORY    Family History   Problem Relation Age of Onset    Diabetes type II Mother     Diabetes Mother         Diseased    Diabetes type II Paternal Aunt     Diabetes type II Maternal Grandmother     Diabetes Maternal Grandmother         Diseased    Diabetes Maternal Aunt         ==========================================================================    SOCIAL HISTORY    Social History     Socioeconomic History    Marital status: Single    Number of children: 0    Years of education: 12    Highest education level: Some college, no degree   Tobacco Use    Smoking status: Never   Vaping Use    Vaping Use: Former    Devices: Disposable   Substance and Sexual Activity    Alcohol use: Yes     Comment: rarely    Drug use: Never    Sexual activity: Yes     Partners: Male     Birth control/protection: Nexplanon       ==========================================================================    MEDICATIONS      Current Outpatient Medications:     acetone, urine, test strip, Use 1 strip As Needed for High Blood Sugar., Disp: 50 each, Rfl: 3    citalopram (CeleXA) 20 MG tablet, , Disp: , Rfl:     Continuous Blood Gluc Sensor (Dexcom G6 Sensor), Use Every 10 (Ten) Days. Change every 10 days and also check BG ACHS, Disp: 9 each, Rfl: 3    Continuous Blood Gluc Transmit (Dexcom G6 Transmitter) misc, 1 each by Other route 4 (Four) Times a Day Before Meals & at Bedtime. Change every 3 months., Disp: 1 each, Rfl: 3    Ferrous Sulfate 27 MG tablet, , Disp: , Rfl:     Glucagon (Gvoke HypoPen 2-Pack) 1 MG/0.2ML solution auto-injector, Inject 1 each under the skin into the appropriate area as directed As Needed (Hypoglycemia)., Disp: 0.4 mL, Rfl: 4    hydrOXYzine (ATARAX) 10 MG tablet, , Disp: , Rfl:     hydrOXYzine (ATARAX) 25 MG tablet, Take 1 tablet by mouth., Disp: , Rfl:     Insulin Glargine (BASAGLAR KWIKPEN) 100 UNIT/ML injection pen, Inject 18 Units under the skin into the appropriate area as directed Daily., Disp: 15 mL, Rfl: 5    Insulin Lispro, 1 Unit Dial, (HumaLOG KwikPen) 100 UNIT/ML solution pen-injector, Inject 8 Units under the skin into the appropriate area as directed 3 (Three) Times a Day., Disp: 15 mL, Rfl: 5    Insulin Pen Needle (Pen Needles) 32G X 4 MM misc, Use 1 each 4 (Four)  Times a Day Before Meals & at Bedtime., Disp: 200 each, Rfl: 5    Iron-Vitamin C 100-250 MG tablet, Take  by mouth., Disp: , Rfl:     traZODone (DESYREL) 50 MG tablet, Take 1 tablet by mouth Every Night., Disp: , Rfl:     glucose blood test strip, 1 each by Other route 4 (Four) Times a Day Before Meals & at Bedtime. Contour Next 1, Disp: 200 each, Rfl: 5    ==========================================================================    ALLERGIES    Allergies   Allergen Reactions    Lantus [Insulin Glargine] Other (See Comments)     Blood sugar spikes  Patient is allergic to Lantus but cannot tolerate Basaglar.       ==========================================================================    OBJECTIVE    Vitals:    12/27/23 1519   BP: 98/80   Pulse: 100   SpO2: 96%     Body mass index is 17.71 kg/m².     General: Alert, cooperative, no acute distress  Lungs: Clear to auscultation bilaterally, respirations unlabored  Heart: Regular rate and rhythm, S1 and S2 normal, no murmur, rub or gallop  Abdomen: Soft, NT, ND and Bowel sounds Positive  Extremities:  Extremities normal, atraumatic, no cyanosis or edema    ==========================================================================    LAB EVALUATION    Lab Results   Component Value Date    GLUCOSE 133 (H) 07/18/2023    BUN 11 07/18/2023    CREATININE 0.67 07/18/2023    BCR 16.4 07/18/2023    K 4.1 07/18/2023    CO2 25.0 07/18/2023    CALCIUM 9.4 07/18/2023    ALBUMIN 4.8 07/18/2023    AST 15 07/18/2023    ALT 12 07/18/2023     Lab Results   Component Value Date    HGBA1C 7.10 (H) 07/18/2023     Lab Results   Component Value Date    MICROALBUR 4.1 07/18/2023    CREATININE 0.67 07/18/2023     Lab Results   Component Value Date    TSH 1.330 07/18/2023    FREET4 1.14 07/18/2023       ==========================================================================    ASSESSMENT AND PLAN    #Type 1 diabetes with history of DKA  #Diabetic Hypoglycemia  - Unfortunately I do not  "have any blood glucose to review  - Get the blood work done today including A1c  - Patient denied any further hypoglycemia episodes   - Will keep working to get patient back on insulin pump  - Will continue the same insulin therapy without any active changes  - Patient to follow-up in the clinic back again in 3 months time  Basaglar 18 units morning  Insulin lispro ICO 1:20   ISF 1:50 > 150  - Last eye was more than 1 year ago, referral provided for repeat eye exam  -Patient will bring the blood glucose logs with Dexcom  to be evaluated after January 12, 2024, will review blood sugars and adjust therapy if needed    Thank you for courtesy of consultation.    Return to clinic: 3 months    Entire assessment and plan was discussed and counseled the patient in detail to which patient verbalized understanding and agreed with care.  Answered all queries and concerns.    This note was created using voice recognition software and is inherently subject to errors including those of syntax and \"sound-alike\" substitutions which may escape proofreading.  In such instances, original meaning may be extrapolated by contextual derivation.    ==========================================================================    INFORMATION PROVIDED TO PATIENT    Patient Instructions   Please,    # Diabetes Management:    Please continue insulin therapy as:    - Insulin Glargine (Slow acting insulin) 18 Units in the morning.  - Insulin lispro / aspart (Fast acting / meal time insulin): Use insulin to carb ratio.  1 unit for every 20 g of carbohydrates.    Meal time insulin need to be taken 15 mins before meal. You can bring your insulin with you if you are planning to eat out.    If you plan to miss the meal please miss the meal time insulin as well.    Check your blood glucose Dexcom.  If there is any problem with your Dexcom check your blood sugar 4 times a day, fasting, before lunch, before dinner and before bedtime.  Please " maintain the log of your blood sugars that you check through fingersticks.    - If your blood glucose in the morning / fasting is less than 100 please decrease the dose of Insulin Glargine by 3 units.    Use correction insulin if your blood is high along with your meal time insulin. Use this correction insulin before meal only, not to be taken without meal.    Corrections for High Blood Sugar  Use the following guide to “correct” for pre-meal high blood sugar.  This insulin will help “correct” the high reading.  You will still need to take as per scheduled meal insulin.    If your   Blood Sugar Reading is…. Number of additional   units of Insulin Lispro to Take…  (Correction)    Take 0 additional unit   151-200 Take 1 additional unit   201-250 Take 2 additional unit   251-300 Take 3 additional unit   301-350 Take 4 additional unit   351-400 Take 5 additional unit   More than 400 Take 6 additional unit         Low Blood Glucose:    - If you feel sweaty, anxious, shakiness, feel weak, trouble walking, feels like passing out or trouble seeing thing, please check your blood glucose and if its less than 70 or if your feel symptoms of low blood glucose then take one of the following or use Glucagon Injection:    *3 or 4 glucose tablets  *½ cup of juice or regular soda (not sugar-free)  *2 tablespoons of raisins  *4 or 5 saltine crackers  *1 tablespoon of sugar  *1 tablespoon of honey or corn syrup  *6 to 8 hard candies    Follow up in 3 months with repeat blood work.    Thank you for your visit today.    If you have any questions or concerns please feel free to reach out of the office.      ==========================================================================  Boyd Ronquillo MD  Department of Endocrine, Diabetes and Metabolism  [facility], [place]  ==========================================================================

## 2023-12-29 ENCOUNTER — TELEPHONE (OUTPATIENT)
Dept: ENDOCRINOLOGY | Facility: CLINIC | Age: 23
End: 2023-12-29
Payer: COMMERCIAL

## 2023-12-29 LAB
ALBUMIN SERPL-MCNC: 4.7 G/DL (ref 3.5–5.2)
ALBUMIN UR-MCNC: 8.5 MG/DL
ALBUMIN/GLOB SERPL: 2.2 G/DL
ALP SERPL-CCNC: 62 U/L (ref 39–117)
ALT SERPL W P-5'-P-CCNC: 13 U/L (ref 1–33)
ANION GAP SERPL CALCULATED.3IONS-SCNC: 11.7 MMOL/L (ref 5–15)
AST SERPL-CCNC: 18 U/L (ref 1–32)
BILIRUB SERPL-MCNC: 0.4 MG/DL (ref 0–1.2)
BUN SERPL-MCNC: 13 MG/DL (ref 6–20)
BUN/CREAT SERPL: 20.6 (ref 7–25)
CALCIUM SPEC-SCNC: 9.4 MG/DL (ref 8.6–10.5)
CHLORIDE SERPL-SCNC: 107 MMOL/L (ref 98–107)
CO2 SERPL-SCNC: 23.3 MMOL/L (ref 22–29)
CREAT SERPL-MCNC: 0.63 MG/DL (ref 0.57–1)
CREAT UR-MCNC: 127 MG/DL
EGFRCR SERPLBLD CKD-EPI 2021: 127.2 ML/MIN/1.73
GLOBULIN UR ELPH-MCNC: 2.1 GM/DL
GLUCOSE SERPL-MCNC: 69 MG/DL (ref 65–99)
HBA1C MFR BLD: 7.7 % (ref 4.8–5.6)
MICROALBUMIN/CREAT UR: 66.9 MG/G (ref 0–29)
POTASSIUM SERPL-SCNC: 3.8 MMOL/L (ref 3.5–5.2)
PROT SERPL-MCNC: 6.8 G/DL (ref 6–8.5)
SODIUM SERPL-SCNC: 142 MMOL/L (ref 136–145)

## 2024-01-08 ENCOUNTER — TELEPHONE (OUTPATIENT)
Dept: ENDOCRINOLOGY | Facility: CLINIC | Age: 24
End: 2024-01-08

## 2024-01-09 ENCOUNTER — TELEPHONE (OUTPATIENT)
Dept: ENDOCRINOLOGY | Facility: CLINIC | Age: 24
End: 2024-01-09
Payer: COMMERCIAL

## 2024-01-09 RX ORDER — INSULIN LISPRO 100 [IU]/ML
INJECTION, SOLUTION INTRAVENOUS; SUBCUTANEOUS
Qty: 20 ML | Refills: 12 | Status: SHIPPED | OUTPATIENT
Start: 2024-01-09

## 2024-01-09 NOTE — TELEPHONE ENCOUNTER
Pt scheduled for tandem pump training this week. RD to send in insulin vial for pump. Faxed pump order form to Ana Paula from [employer].

## 2024-01-11 ENCOUNTER — PATIENT MESSAGE (OUTPATIENT)
Dept: ENDOCRINOLOGY | Facility: CLINIC | Age: 24
End: 2024-01-11
Payer: COMMERCIAL

## 2024-01-11 ENCOUNTER — TELEPHONE (OUTPATIENT)
Dept: ENDOCRINOLOGY | Facility: CLINIC | Age: 24
End: 2024-01-11

## 2024-01-11 NOTE — TELEPHONE ENCOUNTER
Pt called back today and is asking for the documenting of pump paper work and the pump setting. The paper work was sent in but he stated that she was on a pump currently and she is not on a pump at this time. This is going to [facility]. Nisreen advise

## 2024-01-11 NOTE — TELEPHONE ENCOUNTER
Caller: Luis Antonio Sykes    Relationship to patient: Self    Best call back number: [phone]    Patient is needing: PT WAS ON PHONE WITH AMANDA, WAS ON HOLD FOR 15 MIN, NO RESPNSE, WOULD LIKE A CALL BACK FROM [facility] ASAP

## 2024-01-11 NOTE — TELEPHONE ENCOUNTER
Hub staff attempted to follow warm transfer process and was unsuccessful     Caller: Luis Antonio Sykes    Relationship to patient: Self    Best call back number: [phone]    Patient is needing: NEEDS PPW SENT OVER AGAIN REGARDING PTS PUMP SETTINGS.

## 2024-01-12 NOTE — TELEPHONE ENCOUNTER
Update:    - Spoke with patient.  - She got the pump and pump training today.    Boyd Ronquillo MD  15:36 EST  01/12/24

## 2024-01-12 NOTE — TELEPHONE ENCOUNTER
Hub staff attempted to follow warm transfer process and was unsuccessful     Caller: Luis Antonio Sykes    Relationship to patient: Self    Best call back number: [phone]    Patient is needing: PT IS CALLING TO MAKE THE PPW GETS SENT TO  THE RIGHT FAX NUMBER. [phone]

## 2024-02-16 RX ORDER — INSULIN LISPRO 100 [IU]/ML
INJECTION, SOLUTION INTRAVENOUS; SUBCUTANEOUS
Qty: 30 ML | Refills: 5 | Status: SHIPPED | OUTPATIENT
Start: 2024-02-16

## 2024-02-19 ENCOUNTER — TELEPHONE (OUTPATIENT)
Dept: ENDOCRINOLOGY | Facility: CLINIC | Age: 24
End: 2024-02-19
Payer: COMMERCIAL

## 2024-02-19 NOTE — TELEPHONE ENCOUNTER
"Received a fax from [employer] in regards to the medication Basaglar KwikPen 100 unit/ml pen-injectors. When running the PA at this time it states \"available without authorization.\"    Key: [id number]  "

## 2024-03-22 ENCOUNTER — OFFICE VISIT (OUTPATIENT)
Dept: ENDOCRINOLOGY | Facility: CLINIC | Age: 24
End: 2024-03-22
Payer: COMMERCIAL

## 2024-03-22 ENCOUNTER — LAB (OUTPATIENT)
Dept: LAB | Facility: HOSPITAL | Age: 24
End: 2024-03-22
Payer: COMMERCIAL

## 2024-03-22 VITALS
BODY MASS INDEX: 18.16 KG/M2 | HEIGHT: 65 IN | HEART RATE: 114 BPM | SYSTOLIC BLOOD PRESSURE: 100 MMHG | DIASTOLIC BLOOD PRESSURE: 68 MMHG | WEIGHT: 109 LBS | OXYGEN SATURATION: 98 %

## 2024-03-22 DIAGNOSIS — E10.9 TYPE 1 DIABETES MELLITUS WITHOUT COMPLICATION: ICD-10-CM

## 2024-03-22 DIAGNOSIS — E11.649 DIABETIC HYPOGLYCEMIA: ICD-10-CM

## 2024-03-22 LAB
ALBUMIN SERPL-MCNC: 4.6 G/DL (ref 3.5–5.2)
ALBUMIN/GLOB SERPL: 2.3 G/DL
ALP SERPL-CCNC: 66 U/L (ref 39–117)
ALT SERPL W P-5'-P-CCNC: 8 U/L (ref 1–33)
ANION GAP SERPL CALCULATED.3IONS-SCNC: 11.5 MMOL/L (ref 5–15)
AST SERPL-CCNC: 16 U/L (ref 1–32)
BILIRUB SERPL-MCNC: 0.7 MG/DL (ref 0–1.2)
BUN SERPL-MCNC: 16 MG/DL (ref 6–20)
BUN/CREAT SERPL: 20.3 (ref 7–25)
CALCIUM SPEC-SCNC: 8.9 MG/DL (ref 8.6–10.5)
CHLORIDE SERPL-SCNC: 104 MMOL/L (ref 98–107)
CO2 SERPL-SCNC: 22.5 MMOL/L (ref 22–29)
CREAT SERPL-MCNC: 0.79 MG/DL (ref 0.57–1)
EGFRCR SERPLBLD CKD-EPI 2021: 106.6 ML/MIN/1.73
GLOBULIN UR ELPH-MCNC: 2 GM/DL
GLUCOSE SERPL-MCNC: 343 MG/DL (ref 65–99)
HBA1C MFR BLD: 6.9 % (ref 4.8–5.6)
POTASSIUM SERPL-SCNC: 4.3 MMOL/L (ref 3.5–5.2)
PROT SERPL-MCNC: 6.6 G/DL (ref 6–8.5)
SODIUM SERPL-SCNC: 138 MMOL/L (ref 136–145)

## 2024-03-22 PROCEDURE — 36415 COLL VENOUS BLD VENIPUNCTURE: CPT

## 2024-03-22 PROCEDURE — 83036 HEMOGLOBIN GLYCOSYLATED A1C: CPT

## 2024-03-22 PROCEDURE — 80053 COMPREHEN METABOLIC PANEL: CPT

## 2024-03-22 RX ORDER — PROCHLORPERAZINE 25 MG/1
SUPPOSITORY RECTAL
Qty: 9 EACH | Refills: 3 | Status: SHIPPED | OUTPATIENT
Start: 2024-03-22

## 2024-03-22 RX ORDER — PROCHLORPERAZINE 25 MG/1
1 SUPPOSITORY RECTAL
Qty: 1 EACH | Refills: 3 | Status: SHIPPED | OUTPATIENT
Start: 2024-03-22

## 2024-03-22 NOTE — PROGRESS NOTES
-----------------------------------------------------------------  ENDOCRINE CLINIC NOTE  -----------------------------------------------------------------        PATIENT NAME: Luis Antonio Sykes  PATIENT : 2000 AGE: 24 y.o.  MRN NUMBER: [id number]  PRIMARY CARE: Amena Pickett APRN    ==========================================================================    CHIEF COMPLAINT: Type 1 Diabetes  DATE OF SERVICE: 24     ==========================================================================    HPI / SUBJECTIVE    24 y.o. female seen in the clinic today for follow-up visit for type 1 diabetes.  Diagnosed with type 1 diabetes around age 15.  Current therapy includes: Patient is currently back to tandem t:slim pump.  Checking blood sugar through Dexcom G6,  Current pump settings are:  Basal insulin 0.56 units/h with correction factor I unit for 63 and insulin to carb ratio 1 unit for 17 g of carb at target blood sugar 110.  On CGM data there is evidence of postprandial hyperglycemia for which she is currently putting ghost carbs to correct for blood sugar.    ==========================================================================                                                PAST MEDICAL HISTORY    Past Medical History:   Diagnosis Date    Diabetes mellitus type I        ==========================================================================    PAST SURGICAL HISTORY    Past Surgical History:   Procedure Laterality Date    KNEE SURGERY         ==========================================================================    FAMILY HISTORY    Family History   Problem Relation Age of Onset    Diabetes type II Mother     Diabetes Mother         Diseased    Diabetes type II Paternal Aunt     Diabetes type II Maternal Grandmother     Diabetes Maternal Grandmother         Diseased    Diabetes Maternal Aunt        ==========================================================================    SOCIAL  HISTORY    Social History     Socioeconomic History    Marital status: Single    Number of children: 0    Years of education: 12    Highest education level: Some college, no degree   Tobacco Use    Smoking status: Never   Vaping Use    Vaping status: Former    Devices: Disposable   Substance and Sexual Activity    Alcohol use: Yes     Comment: rarely    Drug use: Never    Sexual activity: Yes     Partners: Male     Birth control/protection: Nexplanon       ==========================================================================    MEDICATIONS      Current Outpatient Medications:     acetone, urine, test strip, Use 1 strip As Needed for High Blood Sugar., Disp: 50 each, Rfl: 3    citalopram (CeleXA) 20 MG tablet, , Disp: , Rfl:     Continuous Blood Gluc Sensor (Dexcom G6 Sensor), Use Every 10 (Ten) Days. Change every 10 days and also check BG ACHS, Disp: 9 each, Rfl: 3    Continuous Blood Gluc Transmit (Dexcom G6 Transmitter) misc, 1 each by Other route 4 (Four) Times a Day Before Meals & at Bedtime. Change every 3 months., Disp: 1 each, Rfl: 3    Ferrous Sulfate 27 MG tablet, , Disp: , Rfl:     Glucagon (Gvoke HypoPen 2-Pack) 1 MG/0.2ML solution auto-injector, Inject 1 each under the skin into the appropriate area as directed As Needed (Hypoglycemia)., Disp: 0.4 mL, Rfl: 4    glucose blood test strip, 1 each by Other route 4 (Four) Times a Day Before Meals & at Bedtime. Contour Next 1, Disp: 200 each, Rfl: 5    hydrOXYzine (ATARAX) 10 MG tablet, , Disp: , Rfl:     hydrOXYzine (ATARAX) 25 MG tablet, Take 1 tablet by mouth., Disp: , Rfl:     Insulin Glargine (BASAGLAR KWIKPEN) 100 UNIT/ML injection pen, Inject 18 Units under the skin into the appropriate area as directed Daily., Disp: 15 mL, Rfl: 5    Insulin Lispro (HumaLOG) 100 UNIT/ML injection, Patient to use in insulin pump. TDD 70 units., Disp: 30 mL, Rfl: 5    Insulin Lispro, 1 Unit Dial, (HumaLOG KwikPen) 100 UNIT/ML solution pen-injector, Inject 8 Units  under the skin into the appropriate area as directed 3 (Three) Times a Day., Disp: 15 mL, Rfl: 5    Insulin Pen Needle (Pen Needles) 32G X 4 MM misc, Use 1 each 4 (Four) Times a Day Before Meals & at Bedtime., Disp: 200 each, Rfl: 5    Iron-Vitamin C 100-250 MG tablet, Take  by mouth., Disp: , Rfl:     traZODone (DESYREL) 50 MG tablet, Take 1 tablet by mouth Every Night., Disp: , Rfl:     ==========================================================================    ALLERGIES    Allergies   Allergen Reactions    Lantus [Insulin Glargine] Other (See Comments)     Blood sugar spikes  Patient is allergic to Lantus but cannot tolerate Basaglar.       ==========================================================================    OBJECTIVE    Vitals:    03/22/24 1451   BP: 100/68   Pulse: 114   SpO2: 98%       Body mass index is 18.14 kg/m².     General: Alert, cooperative, no acute distress  Lungs: Clear to auscultation bilaterally, respirations unlabored  Heart: Regular rate and rhythm, S1 and S2 normal, no murmur, rub or gallop  Abdomen: Soft, NT, ND and Bowel sounds Positive  Extremities:  Extremities normal, atraumatic, no cyanosis or edema    ==========================================================================    LAB EVALUATION    Lab Results   Component Value Date    GLUCOSE 69 12/27/2023    BUN 13 12/27/2023    CREATININE 0.63 12/27/2023    BCR 20.6 12/27/2023    K 3.8 12/27/2023    CO2 23.3 12/27/2023    CALCIUM 9.4 12/27/2023    ALBUMIN 4.7 12/27/2023    AST 18 12/27/2023    ALT 13 12/27/2023     Lab Results   Component Value Date    HGBA1C 7.70 (H) 12/27/2023    HGBA1C 7.10 (H) 07/18/2023     Lab Results   Component Value Date    MICROALBUR 8.5 12/27/2023    CREATININE 0.63 12/27/2023     Lab Results   Component Value Date    TSH 1.330 07/18/2023    FREET4 1.14 07/18/2023     ==========================================================================    CGM Data:    Dates: March 9 till March 22, 2024    Very  "High > 250: 24%  High 180 - 250: 22%  Target: 70 - 180: 53%  Low 55 - 70:  <1%  Very Low < 55: 0%    Average blood sugar of 202 with standard deviation of 80.    ==========================================================================    ASSESSMENT AND PLAN    #Type 1 diabetes with history of DKA  #Diabetic Hypoglycemia  - Patient is currently on insulin pump therapy  - Unfortunately I was not able to see the very current data but patient of the subdural pattern on January, February and then again in March 2024 where she has good control overnight but significant hyperglycemia during the day this is postprandial  - Patient is currently putting ghost carbohydrate to correct for blood sugars  - Patient have control IQ as well active and total insulin use age for now is 26.3 units/day with good basal bolus split and patient have control IQ taking care of her now and percent of the blood sugar  - Patient will get benefit from better insulin to carb ratio therefore will increase insulin to carb ratio past 9 AM  - New pump settings:  Basal insulin: 0.56 units for 24 hours  Insulin to carb ratio:  Midnight till 9 AM: 1: 17  9 AM till midnight: 1: 12  Insulin correction factor  Midnight till 9 AM: 1: 63  9 AM till midnight: 1: 50  - No repeat eye exam yet  - Patient to follow-up in clinic back again in 3 months time    Thank you for courtesy of consultation.    Return to clinic: 3 months    Entire assessment and plan was discussed and counseled the patient in detail to which patient verbalized understanding and agreed with care.  Answered all queries and concerns.    This note was created using voice recognition software and is inherently subject to errors including those of syntax and \"sound-alike\" substitutions which may escape proofreading.  In such instances, original meaning may be extrapolated by contextual derivation.    ==========================================================================    INFORMATION " PROVIDED TO PATIENT    Patient Instructions   Please,    - Continue with insulin pump with adjustment made today.  - If you notice to have significant low blood sugar after giving yourself meal bolus then decrease your insulin to carb ratio to 1: 15.  - If you persistently have high blood sugar after meal after these changes then decrease the insulin to carb ratio to 1: 10.    Please always carry some source of sugar given you are at the risk for having low blood sugars or hypoglycemia.    Follow-up in the clinic back again in 3 months time.    Thank you for your visit today.    If you have any questions or concerns please feel free to reach out of the office.       ==========================================================================  Boyd Ronquillo MD  Department of Endocrine, Diabetes and Metabolism  [facility], [place]  ==========================================================================

## 2024-03-22 NOTE — PATIENT INSTRUCTIONS
Please,    - Continue with insulin pump with adjustment made today.  - If you notice to have significant low blood sugar after giving yourself meal bolus then decrease your insulin to carb ratio to 1: 15.  - If you persistently have high blood sugar after meal after these changes then decrease the insulin to carb ratio to 1: 10.    Please always carry some source of sugar given you are at the risk for having low blood sugars or hypoglycemia.    Follow-up in the clinic back again in 3 months time.    Thank you for your visit today.    If you have any questions or concerns please feel free to reach out of the office.

## 2024-05-16 ENCOUNTER — TELEPHONE (OUTPATIENT)
Dept: ENDOCRINOLOGY | Facility: CLINIC | Age: 24
End: 2024-05-16
Payer: COMMERCIAL

## 2024-06-12 ENCOUNTER — LAB (OUTPATIENT)
Dept: LAB | Facility: HOSPITAL | Age: 24
End: 2024-06-12
Payer: COMMERCIAL

## 2024-06-12 DIAGNOSIS — E10.9 TYPE 1 DIABETES MELLITUS WITHOUT COMPLICATION: ICD-10-CM

## 2024-06-12 LAB — HBA1C MFR BLD: 7.27 % (ref 4.8–5.6)

## 2024-06-12 PROCEDURE — 83036 HEMOGLOBIN GLYCOSYLATED A1C: CPT

## 2024-06-12 PROCEDURE — 84443 ASSAY THYROID STIM HORMONE: CPT

## 2024-06-12 PROCEDURE — 80053 COMPREHEN METABOLIC PANEL: CPT

## 2024-06-12 PROCEDURE — 84439 ASSAY OF FREE THYROXINE: CPT

## 2024-06-12 PROCEDURE — 36415 COLL VENOUS BLD VENIPUNCTURE: CPT

## 2024-06-13 LAB
ALBUMIN SERPL-MCNC: 4.1 G/DL (ref 3.5–5.2)
ALBUMIN/GLOB SERPL: 2.2 G/DL
ALP SERPL-CCNC: 57 U/L (ref 39–117)
ALT SERPL W P-5'-P-CCNC: 13 U/L (ref 1–33)
ANION GAP SERPL CALCULATED.3IONS-SCNC: 10 MMOL/L (ref 5–15)
AST SERPL-CCNC: 12 U/L (ref 1–32)
BILIRUB SERPL-MCNC: 0.6 MG/DL (ref 0–1.2)
BUN SERPL-MCNC: 10 MG/DL (ref 6–20)
BUN/CREAT SERPL: 14.7 (ref 7–25)
CALCIUM SPEC-SCNC: 8.7 MG/DL (ref 8.6–10.5)
CHLORIDE SERPL-SCNC: 107 MMOL/L (ref 98–107)
CO2 SERPL-SCNC: 24 MMOL/L (ref 22–29)
CREAT SERPL-MCNC: 0.68 MG/DL (ref 0.57–1)
EGFRCR SERPLBLD CKD-EPI 2021: 124.1 ML/MIN/1.73
GLOBULIN UR ELPH-MCNC: 1.9 GM/DL
GLUCOSE SERPL-MCNC: 200 MG/DL (ref 65–99)
POTASSIUM SERPL-SCNC: 3.9 MMOL/L (ref 3.5–5.2)
PROT SERPL-MCNC: 6 G/DL (ref 6–8.5)
SODIUM SERPL-SCNC: 141 MMOL/L (ref 136–145)
T4 FREE SERPL-MCNC: 1.04 NG/DL (ref 0.92–1.68)
TSH SERPL DL<=0.05 MIU/L-ACNC: 1.01 UIU/ML (ref 0.27–4.2)

## 2024-09-04 ENCOUNTER — TELEPHONE (OUTPATIENT)
Dept: ENDOCRINOLOGY | Facility: CLINIC | Age: 24
End: 2024-09-04
Payer: COMMERCIAL

## 2024-09-04 NOTE — TELEPHONE ENCOUNTER
called to reschedule appt due to provider being out   Lm to call office back.   Hub can schedule next available.

## 2024-10-28 ENCOUNTER — TELEPHONE (OUTPATIENT)
Dept: ENDOCRINOLOGY | Facility: CLINIC | Age: 24
End: 2024-10-28
Payer: COMMERCIAL

## 2024-10-30 DIAGNOSIS — E11.649 DIABETIC HYPOGLYCEMIA: ICD-10-CM

## 2024-10-30 DIAGNOSIS — E10.9 TYPE 1 DIABETES MELLITUS WITHOUT COMPLICATION: ICD-10-CM

## 2024-10-31 RX ORDER — GLUCAGON INJECTION, SOLUTION 1 MG/.2ML
INJECTION, SOLUTION SUBCUTANEOUS
Qty: 0.4 ML | Refills: 4 | Status: SHIPPED | OUTPATIENT
Start: 2024-10-31

## 2025-02-03 RX ORDER — INSULIN LISPRO 100 [IU]/ML
INJECTION, SOLUTION INTRAVENOUS; SUBCUTANEOUS
Qty: 30 ML | Refills: 5 | Status: SHIPPED | OUTPATIENT
Start: 2025-02-03